# Patient Record
Sex: MALE | Race: WHITE | NOT HISPANIC OR LATINO | URBAN - METROPOLITAN AREA
[De-identification: names, ages, dates, MRNs, and addresses within clinical notes are randomized per-mention and may not be internally consistent; named-entity substitution may affect disease eponyms.]

---

## 2017-01-01 ENCOUNTER — EMERGENCY (EMERGENCY)
Facility: HOSPITAL | Age: 67
LOS: 0 days | End: 2017-12-05
Attending: EMERGENCY MEDICINE
Payer: SELF-PAY

## 2017-01-01 VITALS — HEIGHT: 67 IN | WEIGHT: 160.06 LBS

## 2017-01-01 VITALS — TEMPERATURE: 98 F | OXYGEN SATURATION: 50 %

## 2017-01-01 PROCEDURE — 99285 EMERGENCY DEPT VISIT HI MDM: CPT | Mod: 25

## 2017-01-01 PROCEDURE — 92950 HEART/LUNG RESUSCITATION CPR: CPT

## 2017-12-05 NOTE — ED PROVIDER NOTE - MEDICAL DECISION MAKING DETAILS
Patient found in asystole, no ROSC, patient pronounced at 14:57 pm; family aware Patient found in asystole, no ROSC, patient pronounced at 14:57 pm; family aware; [place] [phone]

## 2017-12-05 NOTE — ED ADULT NURSE REASSESSMENT NOTE - NS ED NURSE REASSESS COMMENT FT1
received from ems , full cardiac code mode , intubated in the field with 7.5ett , acls protocol continued in the ER with the aid of BHARGAV , patient remains  asystole . and pulseles. (see code :cardiac sheets for resuscitations efforts) will continue to monitor

## 2017-12-05 NOTE — ED ADULT TRIAGE NOTE - CHIEF COMPLAINT QUOTE
biba in cardiac arrest acls in progress per ems found unresponsive in car unknown downtime intubated 7.5 ett in field il l tibia 4 epis given fs=30 per ems d50= I amp ivp given

## 2017-12-05 NOTE — ED PROVIDER NOTE - OBJECTIVE STATEMENT
68 yo male presents by EMS intubated, found sitting in care in unknown driveway; patient in 's seat with foot on brake, engine active. Patient received cpr and intubation in field; patient at that time was asystolic. Patient received epinephrine x3 and dextrose x1 amp with finger stick of 30. Patient transported to ED.

## 2017-12-06 LAB
GLUCOSE BLDC GLUCOMTR-MCNC: 18 MG/DL — CRITICAL LOW (ref 70–99)
GLUCOSE BLDC GLUCOMTR-MCNC: 195 MG/DL — HIGH (ref 70–99)
GLUCOSE BLDC GLUCOMTR-MCNC: 24 MG/DL — CRITICAL LOW (ref 70–99)
GLUCOSE BLDC GLUCOMTR-MCNC: 27 MG/DL — CRITICAL LOW (ref 70–99)
GLUCOSE BLDC GLUCOMTR-MCNC: 33 MG/DL — CRITICAL LOW (ref 70–99)
GLUCOSE BLDC GLUCOMTR-MCNC: 58 MG/DL — LOW (ref 70–99)

## 2018-01-03 DIAGNOSIS — I46.9 CARDIAC ARREST, CAUSE UNSPECIFIED: ICD-10-CM

## 2018-01-03 DIAGNOSIS — F17.210 NICOTINE DEPENDENCE, CIGARETTES, UNCOMPLICATED: ICD-10-CM

## 2022-02-10 NOTE — ED PROVIDER NOTE - RESPIRATORY DISTRESS
YES/intubated Rituxan Pregnancy And Lactation Text: This medication is Pregnancy Category C and it isn't know if it is safe during pregnancy. It is unknown if this medication is excreted in breast milk but similar antibodies are known to be excreted.

## 2022-11-14 NOTE — PROGRESS NOTES
Assessment/Plan:    Problem List Items Addressed This Visit    None          There are no diagnoses linked to this encounter. No problem-specific Assessment & Plan notes found for this encounter. Subjective:      Patient ID: Sarai Bowie is a 74 y.o. male. HPI    The following portions of the patient's history were reviewed and updated as appropriate:   He has no past medical history on file. ,  does not have a problem list on file. ,   has no past surgical history on file. ,  family history is not on file. ,   has no history on file for tobacco use, alcohol use, and drug use.,  has no allergies on file. .  No current outpatient medications on file. No current facility-administered medications for this visit. Review of Systems      Objective: There were no vitals filed for this visit. There is no height or weight on file to calculate BMI.      Physical Exam     PHQ-2/9 Depression Screening
